# Patient Record
Sex: MALE | Race: WHITE | URBAN - METROPOLITAN AREA
[De-identification: names, ages, dates, MRNs, and addresses within clinical notes are randomized per-mention and may not be internally consistent; named-entity substitution may affect disease eponyms.]

---

## 2023-06-17 ENCOUNTER — OFFICE (OUTPATIENT)
Dept: URBAN - METROPOLITAN AREA CLINIC 8 | Facility: CLINIC | Age: 88
Setting detail: OPHTHALMOLOGY
End: 2023-06-17
Payer: MEDICARE

## 2023-06-17 DIAGNOSIS — H40.053: ICD-10-CM

## 2023-06-17 DIAGNOSIS — H11.153: ICD-10-CM

## 2023-06-17 DIAGNOSIS — H25.13: ICD-10-CM

## 2023-06-17 DIAGNOSIS — H43.813: ICD-10-CM

## 2023-06-17 PROCEDURE — 92133 CPTRZD OPH DX IMG PST SGM ON: CPT | Performed by: OPHTHALMOLOGY

## 2023-06-17 PROCEDURE — 99214 OFFICE O/P EST MOD 30 MIN: CPT | Performed by: OPHTHALMOLOGY

## 2023-06-17 ASSESSMENT — AXIALLENGTH_DERIVED
OD_AL: 22.8781
OD_AL: 23.2504
OD_AL: 23.2504

## 2023-06-17 ASSESSMENT — REFRACTION_MANIFEST
OS_VA1: 20/30+3
OD_CYLINDER: -0.75
OD_SPHERE: +1.50
OS_ADD: +2.50
OS_SPHERE: +1.25
OS_CYLINDER: -0.50
OD_ADD: +2.50
OS_SPHERE: +2.75
OD_VA2: 20/25(J1)
OS_AXIS: 120
OD_ADD: +2.50
OS_AXIS: 120
OD_AXIS: 110
OD_SPHERE: +1.50
OS_ADD: +2.50
OS_VA2: 20/25(J1)
OD_VA1: 20/25+2
OD_VA1: 20/25+2
OD_AXIS: 110
OS_VA1: 20/25-2
OD_CYLINDER: -0.75
OS_CYLINDER: -0.50

## 2023-06-17 ASSESSMENT — SPHEQUIV_DERIVED
OD_SPHEQUIV: 1.125
OS_SPHEQUIV: 1
OS_SPHEQUIV: 2.5
OD_SPHEQUIV: 2.125
OD_SPHEQUIV: 1.125

## 2023-06-17 ASSESSMENT — CONFRONTATIONAL VISUAL FIELD TEST (CVF)
OD_FINDINGS: FULL
OS_FINDINGS: FULL

## 2023-06-17 ASSESSMENT — VISUAL ACUITY
OS_BCVA: 20/50+2
OD_BCVA: 20/50-2

## 2023-06-17 ASSESSMENT — KERATOMETRY
OD_AXISANGLE_DEGREES: 090
OD_K2POWER_DIOPTERS: 43.25
METHOD_AUTO_MANUAL: AUTO
OD_K1POWER_DIOPTERS: 43.25

## 2023-06-17 ASSESSMENT — REFRACTION_AUTOREFRACTION
OD_SPHERE: +3.75
OD_AXIS: 110
OD_CYLINDER: -3.25

## 2023-06-17 ASSESSMENT — TONOMETRY
OD_IOP_MMHG: 10
OS_IOP_MMHG: 12

## 2024-02-10 PROBLEM — H25.13 CATARACT ; BOTH EYES: Status: ACTIVE | Noted: 2024-02-10

## 2024-04-22 PROBLEM — Z00.00 ENCOUNTER FOR PREVENTIVE HEALTH EXAMINATION: Status: ACTIVE | Noted: 2024-04-22

## 2024-04-29 ENCOUNTER — APPOINTMENT (OUTPATIENT)
Dept: CARDIOLOGY | Facility: CLINIC | Age: 89
End: 2024-04-29
Payer: MEDICARE

## 2024-04-29 VITALS
DIASTOLIC BLOOD PRESSURE: 100 MMHG | SYSTOLIC BLOOD PRESSURE: 168 MMHG | HEIGHT: 72 IN | BODY MASS INDEX: 25.06 KG/M2 | HEART RATE: 92 BPM | WEIGHT: 185 LBS | OXYGEN SATURATION: 98 %

## 2024-04-29 VITALS — DIASTOLIC BLOOD PRESSURE: 102 MMHG | SYSTOLIC BLOOD PRESSURE: 164 MMHG

## 2024-04-29 PROCEDURE — 99204 OFFICE O/P NEW MOD 45 MIN: CPT

## 2024-04-29 PROCEDURE — G2211 COMPLEX E/M VISIT ADD ON: CPT

## 2024-04-29 RX ORDER — APIXABAN 5 MG/1
5 TABLET, FILM COATED ORAL
Qty: 180 | Refills: 1 | Status: ACTIVE | COMMUNITY
Start: 2024-04-29 | End: 1900-01-01

## 2024-04-29 NOTE — DISCUSSION/SUMMARY
[FreeTextEntry1] : Patient has medical history detailed above and active medical issues including:  - Mechanical fall periorbital hematoma, head CT negative CVA  - Chronic AF [id number] score 2 start Eliquis 5 Mg twice daily repeat labs ordered.  EP consultation,  Rhythm express 1 week heart monitor started today.  Patient will have noninvasive testing with exercise stress echo to assess for obstructive CAD, HR and BP response, exercise-induced arrhythmia, echocardiogram for LVEF, structural heart disease, carotid and abdominal ultrasound to assess for obstructive PAD.  - Elevated blood pressures on hospital admission,  normotensive after discharge.  Follow-up average resting home BPs to confirm at guideline goal  - Family history of premature CAD mother  MI at age 80  Advised patient to follow active lifestyle with regular cardiovascular exercise. Patient educated on heart healthy diet. Recommend increased oral hydration with electrolyte supplement drinks, avoid excess alcohol and caffeine.  Patient is aware to call with any symptoms or concerns.  Cardiology follow-up after noninvasive testing  Bertram will follow-up with Dr. Compa Gary for primary care  Total time spent 45 minutes, reviewing of test results, chart information, patient discussion, physical exam and completion of chart documentation.  Patient will be seen in cardiology follow-up after noninvasive testing.

## 2024-04-29 NOTE — REASON FOR VISIT
[Other: ____] : [unfilled] [FreeTextEntry1] : Bertram is a 88-year-old male with history of A-fib on aspirin, family history CAD.  No history of CAD, MI, revascularization, VHD, CHF, TIA, CVA, diabetes, PVD, DVT, PE, arrhythmia, AF.  Cardiovascular review of symptoms is negative for exertional chest pain, dyspnea, palpitations, dizziness or syncope.  No PND or orthopnea leg edema.  No bleeding or black stool.  Patient is exercising  riding an exercise bike 120 miles per week without exertional symptoms.  Patient drinks 2 or more alcoholic beverages per day, 2 coffees per day.  Recommended increased oral hydration with electrolyte suppliment drinks, limit caffeine and alcohol.  EKG [facility] 4/18/2024 AF 80 bpm labs 1424 normal CBC, BMP, magnesium, HbA1c 5.9

## 2024-04-29 NOTE — PHYSICAL EXAM

## 2024-04-30 ENCOUNTER — APPOINTMENT (OUTPATIENT)
Dept: CARDIOLOGY | Facility: CLINIC | Age: 89
End: 2024-04-30

## 2024-04-30 PROCEDURE — 93242 EXT ECG>48HR<7D RECORDING: CPT

## 2024-05-10 LAB
ALBUMIN SERPL ELPH-MCNC: 3.9 G/DL
ALP BLD-CCNC: 68 U/L
ALT SERPL-CCNC: 12 U/L
ANION GAP SERPL CALC-SCNC: 11 MMOL/L
AST SERPL-CCNC: 18 U/L
BILIRUB DIRECT SERPL-MCNC: 0.2 MG/DL
BILIRUB INDIRECT SERPL-MCNC: 0.3 MG/DL
BILIRUB SERPL-MCNC: 0.5 MG/DL
BUN SERPL-MCNC: 24 MG/DL
CALCIUM SERPL-MCNC: 9.2 MG/DL
CHLORIDE SERPL-SCNC: 104 MMOL/L
CHOLEST SERPL-MCNC: 185 MG/DL
CK SERPL-CCNC: 57 U/L
CO2 SERPL-SCNC: 26 MMOL/L
CREAT SERPL-MCNC: 0.71 MG/DL
EGFR: 89 ML/MIN/1.73M2
ESTIMATED AVERAGE GLUCOSE: 120 MG/DL
GLUCOSE SERPL-MCNC: 95 MG/DL
HBA1C MFR BLD HPLC: 5.8 %
HDLC SERPL-MCNC: 76 MG/DL
LDLC SERPL CALC-MCNC: 99 MG/DL
MAGNESIUM SERPL-MCNC: 2.2 MG/DL
NONHDLC SERPL-MCNC: 109 MG/DL
POTASSIUM SERPL-SCNC: 4.2 MMOL/L
PROT SERPL-MCNC: 7 G/DL
SODIUM SERPL-SCNC: 141 MMOL/L
TRIGL SERPL-MCNC: 54 MG/DL
TSH SERPL-ACNC: 3.4 UIU/ML

## 2024-05-13 LAB — APO LP(A) SERPL-MCNC: 125.6 NMOL/L

## 2024-05-15 PROCEDURE — 93244 EXT ECG>48HR<7D REV&INTERPJ: CPT

## 2024-05-20 ENCOUNTER — APPOINTMENT (OUTPATIENT)
Dept: CARDIOLOGY | Facility: CLINIC | Age: 89
End: 2024-05-20
Payer: MEDICARE

## 2024-05-20 PROCEDURE — 93978 VASCULAR STUDY: CPT

## 2024-05-20 PROCEDURE — 93880 EXTRACRANIAL BILAT STUDY: CPT

## 2024-05-20 PROCEDURE — 76376 3D RENDER W/INTRP POSTPROCES: CPT

## 2024-05-20 PROCEDURE — 93306 TTE W/DOPPLER COMPLETE: CPT

## 2024-05-22 ENCOUNTER — APPOINTMENT (OUTPATIENT)
Dept: ELECTROPHYSIOLOGY | Facility: CLINIC | Age: 89
End: 2024-05-22
Payer: MEDICARE

## 2024-05-22 VITALS — WEIGHT: 178 LBS | OXYGEN SATURATION: 96 % | HEIGHT: 72 IN | BODY MASS INDEX: 24.11 KG/M2 | HEART RATE: 94 BPM

## 2024-05-22 VITALS — SYSTOLIC BLOOD PRESSURE: 142 MMHG | DIASTOLIC BLOOD PRESSURE: 94 MMHG

## 2024-05-22 PROCEDURE — 99204 OFFICE O/P NEW MOD 45 MIN: CPT

## 2024-05-22 PROCEDURE — G2211 COMPLEX E/M VISIT ADD ON: CPT

## 2024-05-23 NOTE — DISCUSSION/SUMMARY
[FreeTextEntry1] : Patient has medical history detailed above and active medical issues including: - Mechanical fall periorbital hematoma, head CT negative CVA - Chronic AF [id number] score 2 was start Eliquis 5 Mg twice daily repeat labs ordered.   - Advised patient to follow active lifestyle with regular cardiovascular exercise. Patient educated on heart healthy diet. Recommend increased oral hydration with electrolyte supplement drinks, avoid excess alcohol and caffeine.  Patient is aware to call with any symptoms or concerns. From an electrophysiology standpoint the patient has chronic AF and is not an ideal candidate for an PVI isolation. Risk benefit in an elderly gentleman with chronic AF bodes a poor success rate. Patient is a candidate for a Watchman (appendage isolation).  Patient has chronotropic competence preserved LVEF and nocturnal pause longest 3.3 sec. No indication for pacemaker and based on RACE study has no limitations with modest rate control.  F/U with me in several months. Patient was given literature to research the watchman procedure.   Total time spent 45 minutes, reviewing of test results, chart information, patient discussion, physical exam and completion of chart documentation.

## 2024-05-23 NOTE — REASON FOR VISIT
[Arrhythmia/ECG Abnorrmalities] : arrhythmia/ECG abnormalities [FreeTextEntry1] : 88-year-old male with history of A-fib on aspirin, family history CAD. No history of CAD, MI, revascularization, VHD, CHF, TIA, CVA, diabetes, PVD, DVT, PE, arrhythmia, AF. Cardiovascular review of symptoms is negative for exertional chest pain, dyspnea, palpitations, dizziness or syncope.  No PND or orthopnea leg edema.  No bleeding or black stool. Patient was very active and has no exercise limitations. Patient drinks 2 or more alcoholic beverages per day, 2 coffees per day.  He was previously recommended to increase oral hydration with electrolyte supplement drinks, limit caffeine and alcohol.  EKG [facility] 4/18/2024 AF 80 bpm labs 1424 normal CBC, BMP, magnesium, HbA1c 5.9 The patient with chronic asymptomatic atrial fibrillation, no syncope, near syncope, dizziness or other constitutional symptoms.  The patient was referred for an EP evaluation.

## 2024-05-23 NOTE — PHYSICAL EXAM

## 2024-05-30 ENCOUNTER — APPOINTMENT (OUTPATIENT)
Dept: CARDIOLOGY | Facility: CLINIC | Age: 89
End: 2024-05-30
Payer: MEDICARE

## 2024-05-30 PROCEDURE — 93320 DOPPLER ECHO COMPLETE: CPT

## 2024-05-30 PROCEDURE — 93351 STRESS TTE COMPLETE: CPT

## 2024-06-04 ENCOUNTER — APPOINTMENT (OUTPATIENT)
Dept: CARDIOLOGY | Facility: CLINIC | Age: 89
End: 2024-06-04
Payer: MEDICARE

## 2024-06-04 VITALS
BODY MASS INDEX: 23.84 KG/M2 | SYSTOLIC BLOOD PRESSURE: 148 MMHG | HEIGHT: 72 IN | HEART RATE: 88 BPM | WEIGHT: 176 LBS | DIASTOLIC BLOOD PRESSURE: 82 MMHG | OXYGEN SATURATION: 96 %

## 2024-06-04 DIAGNOSIS — I48.0 PAROXYSMAL ATRIAL FIBRILLATION: ICD-10-CM

## 2024-06-04 DIAGNOSIS — Z82.49 FAMILY HISTORY OF ISCHEMIC HEART DISEASE AND OTHER DISEASES OF THE CIRCULATORY SYSTEM: ICD-10-CM

## 2024-06-04 DIAGNOSIS — Z78.9 OTHER SPECIFIED HEALTH STATUS: ICD-10-CM
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
## 2024-06-04 DIAGNOSIS — I49.5 PAROXYSMAL ATRIAL FIBRILLATION: ICD-10-CM

## 2024-06-04 DIAGNOSIS — I48.91 UNSPECIFIED ATRIAL FIBRILLATION: ICD-10-CM

## 2024-06-04 DIAGNOSIS — Z83.438 FAMILY HISTORY OF OTHER DISORDER OF LIPOPROTEIN METABOLISM AND OTHER LIPIDEMIA: ICD-10-CM

## 2024-06-04 DIAGNOSIS — Z09 ENCOUNTER FOR FOLLOW-UP EXAMINATION AFTER COMPLETED TREATMENT FOR CONDITIONS OTHER THAN MALIGNANT NEOPLASM: ICD-10-CM

## 2024-06-04 PROCEDURE — 99214 OFFICE O/P EST MOD 30 MIN: CPT

## 2024-06-04 PROCEDURE — G2211 COMPLEX E/M VISIT ADD ON: CPT

## 2024-06-04 RX ORDER — ASPIRIN 325 MG/1
325 TABLET, FILM COATED ORAL
Refills: 0 | Status: DISCONTINUED | COMMUNITY
End: 2024-06-04

## 2024-06-05 ENCOUNTER — APPOINTMENT (OUTPATIENT)
Dept: CARDIOLOGY | Facility: CLINIC | Age: 89
End: 2024-06-05
Payer: MEDICARE

## 2024-06-05 VITALS
DIASTOLIC BLOOD PRESSURE: 98 MMHG | BODY MASS INDEX: 24.92 KG/M2 | OXYGEN SATURATION: 97 % | WEIGHT: 184 LBS | HEART RATE: 71 BPM | HEIGHT: 72 IN | SYSTOLIC BLOOD PRESSURE: 140 MMHG

## 2024-06-05 PROBLEM — I48.0 PAROXYSMAL ATRIAL FIBRILLATION WITH CONVERSION PAUSES: Status: ACTIVE | Noted: 2024-06-05

## 2024-06-05 PROBLEM — Z78.9 EXERCISES DAILY: Status: ACTIVE | Noted: 2024-04-29

## 2024-06-05 PROBLEM — I48.91 ATRIAL FIBRILLATION: Status: ACTIVE | Noted: 2024-04-29

## 2024-06-05 PROBLEM — Z78.9 CAFFEINE USE: Status: ACTIVE | Noted: 2024-04-29

## 2024-06-05 PROBLEM — Z83.438 FAMILY HISTORY OF HYPERLIPIDEMIA: Status: ACTIVE | Noted: 2024-04-29
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
## 2024-06-05 PROBLEM — Z78.9 ALCOHOL USE: Status: ACTIVE | Noted: 2024-04-29

## 2024-06-05 PROBLEM — Z82.49 FH: HEART ATTACK: Status: ACTIVE | Noted: 2024-04-29

## 2024-06-05 PROBLEM — Z09 HOSPITAL DISCHARGE FOLLOW-UP: Status: ACTIVE | Noted: 2024-04-29

## 2024-06-05 PROCEDURE — G2211 COMPLEX E/M VISIT ADD ON: CPT

## 2024-06-05 PROCEDURE — 99215 OFFICE O/P EST HI 40 MIN: CPT

## 2024-06-05 NOTE — PHYSICAL EXAM

## 2024-06-05 NOTE — REASON FOR VISIT
[Arrhythmia/ECG Abnorrmalities] : arrhythmia/ECG abnormalities [FreeTextEntry1] : 88-year-old male with history of A-fib on aspirin, family history CAD. No history of CAD, MI, revascularization, VHD, CHF, TIA, CVA, diabetes, PVD, DVT, PE, arrhythmia, AF. Cardiovascular review of symptoms is negative for exertional chest pain, dyspnea, palpitations, dizziness or syncope.  No PND or orthopnea leg edema.  No bleeding or black stool. Patient was very active and has no exercise limitations. Patient drinks 2 or more alcoholic beverages per day, 2 coffees per day.  He was previously recommended to increase oral hydration with electrolyte supplement drinks, limit caffeine and alcohol.  EKG [facility] 4/18/2024 AF 80 bpm labs 1424 normal CBC, BMP, magnesium, HbA1c 5.9 The patient with chronic asymptomatic atrial fibrillation, no syncope, near syncope, dizziness or other constitutional symptoms.  The patient was referred for an EP evaluation.  Interval History (06/04/2024) - Patient with no interval complaints. Here to discuss the WATCHMAN  procedure.

## 2024-06-05 NOTE — PHYSICAL EXAM

## 2024-06-05 NOTE — DISCUSSION/SUMMARY
[FreeTextEntry1] : Patient has medical history detailed above and active medical issues including:  - Asymptomatic chronic AF [id number] score 2 on Eliquis 5 Mg twice daily.  Appreciate EP consultation Dr Jeferson Archer, asymptomatic brief pauses up to 3.3 seconds, average A-fib rate 80s.  Patient is considering watchman's device, will call if he wishes to proceed.  - Elevated blood pressures on hospital admission,  normotensive after discharge.  Follow-up average resting home BPs to confirm at guideline goal  - Mechanical fall periorbital hematoma, head CT negative CVA  - Family history of premature CAD mother  MI at age 80  Advised patient to follow active lifestyle with regular cardiovascular exercise. Patient educated on heart healthy diet. Recommend increased oral hydration with electrolyte supplement drinks, avoid excess alcohol and caffeine.  Patient is aware to call with any symptoms or concerns.  Cardiology follow-up 3 months with Zio patch 1 week heart monitor  Bertram will follow-up with Dr. Compa Gary for primary care  Total time spent 45 minutes, reviewing of test results, chart information, patient discussion, physical exam and completion of chart documentation.  Patient will be seen in cardiology follow-up after noninvasive testing.

## 2024-06-05 NOTE — REASON FOR VISIT
[Other: ____] : [unfilled] [FreeTextEntry1] : Bertram is a 88-year-old male with history of A-fib on aspirin, family history CAD.  No history of CAD, MI, revascularization, VHD, CHF, TIA, CVA, diabetes, PVD, DVT, PE, arrhythmia, AF.  Cardiovascular review of symptoms is negative for exertional chest pain, dyspnea, palpitations, dizziness or syncope.  No PND or orthopnea leg edema.  No bleeding or black stool.  Patient is exercising  riding an exercise bike 120 miles per week without exertional symptoms.  Patient drinks 2 or more alcoholic beverages per day, 2 coffees per day.  Recommended increased oral hydration with electrolyte suppliment drinks, limit caffeine and alcohol.  Exercise stress echo May 2024 normal LVEF with normal exercise wall motion, nonischemic EKG response, 114% MPHR, 3 minutes Neel protocol, baseline AF RBBB  Zio patch 1 week heart monitor April 2024 A-fib 100% average heart rate 83, asymptomatic 4 pauses longest 3.3 seconds while awake at 7:30 PM  Echocardiogram May 2024 LVEF 55 to 60%, severe LAE, moderate MR, moderate PH  Carotid and abdominal ultrasound May 2024, mild nonobstructive plaque, normal abdominal aortic size.   EKG [facility] 4/18/2024 AF 80 bpm labs 1424 normal CBC, BMP, magnesium, HbA1c 5.9

## 2024-06-05 NOTE — DISCUSSION/SUMMARY
[FreeTextEntry1] : Patient has medical history detailed above and active medical issues including: - Mechanical fall periorbital hematoma, head CT negative CVA - Chronic AF [id number] score 2 was start Eliquis 5 Mg twice daily repeat labs ordered.   - Advised patient to follow active lifestyle with regular cardiovascular exercise. Patient educated on heart healthy diet. Recommend increased oral hydration with electrolyte supplement drinks, avoid excess alcohol and caffeine.  Patient is aware to call with any symptoms or concerns. From an electrophysiology standpoint the patient has chronic AF and is not an ideal candidate for an PVI isolation. Risk benefit in an elderly gentleman with chronic AF bodes a poor success rate. Patient is a candidate for a Watchman (appendage isolation).  He is still considering.  Patient has chronotropic competence preserved LVEF and nocturnal pause longest 3.3 sec. No indication for pacemaker and based on RACE study has no limitations with modest rate control.  F/U with me in several months. Patient was given literature to research the watchman procedure.   Total time spent 35 minutes, reviewing of test results, chart information, patient discussion, physical exam and completion of chart documentation.

## 2024-08-12 ENCOUNTER — APPOINTMENT (OUTPATIENT)
Dept: CARDIOLOGY | Facility: CLINIC | Age: 89
End: 2024-08-12

## 2024-09-04 ENCOUNTER — APPOINTMENT (OUTPATIENT)
Dept: CARDIOLOGY | Facility: CLINIC | Age: 89
End: 2024-09-04

## 2024-11-15 ENCOUNTER — RX RENEWAL (OUTPATIENT)
Age: 89
End: 2024-11-15

## 2025-07-29 ENCOUNTER — OFFICE (OUTPATIENT)
Dept: URBAN - METROPOLITAN AREA CLINIC 8 | Facility: CLINIC | Age: 89
Setting detail: OPHTHALMOLOGY
End: 2025-07-29
Payer: MEDICARE

## 2025-07-29 DIAGNOSIS — H40.053: ICD-10-CM

## 2025-07-29 DIAGNOSIS — H25.13: ICD-10-CM

## 2025-07-29 DIAGNOSIS — H43.813: ICD-10-CM

## 2025-07-29 PROCEDURE — 92014 COMPRE OPH EXAM EST PT 1/>: CPT

## 2025-07-29 PROCEDURE — 92133 CPTRZD OPH DX IMG PST SGM ON: CPT

## 2025-07-29 ASSESSMENT — REFRACTION_MANIFEST
OS_ADD: +2.50
OS_VA1: 20/30+3
OS_VA2: 20/25(J1)
OD_AXIS: 110
OD_ADD: +2.50
OS_AXIS: 120
OD_CYLINDER: -0.75
OS_SPHERE: +1.50
OD_AXIS: 120
OD_SPHERE: +1.50
OD_VA1: 20/50-2
OD_VA2: 20/25(J1)
OD_CYLINDER: -0.75
OS_VA2: 20/25(J1)
OS_VA1: 20/40-
OS_ADD: +2.50
OS_AXIS: 120
OS_SPHERE: +1.25
OD_VA2: 20/25(J1)
OD_VA1: 20/25+2
OS_CYLINDER: -0.50
OD_ADD: +2.50
OS_CYLINDER: -0.50
OD_SPHERE: +1.50
OU_VA: 20/40-

## 2025-07-29 ASSESSMENT — VISUAL ACUITY
OD_BCVA: 20/40-
OS_BCVA: 20/50-2

## 2025-07-29 ASSESSMENT — KERATOMETRY
METHOD_AUTO_MANUAL: AUTO
OD_AXISANGLE_DEGREES: 090
OD_K2POWER_DIOPTERS: 43.25
OD_K1POWER_DIOPTERS: 43.25

## 2025-07-29 ASSESSMENT — CONFRONTATIONAL VISUAL FIELD TEST (CVF)
OD_FINDINGS: FULL
OS_FINDINGS: FULL

## 2025-07-29 ASSESSMENT — REFRACTION_AUTOREFRACTION
OD_SPHERE: +3.25
OD_AXIS: 118
OD_CYLINDER: -3.25